# Patient Record
Sex: MALE | Race: ASIAN | NOT HISPANIC OR LATINO | Employment: FULL TIME | URBAN - METROPOLITAN AREA
[De-identification: names, ages, dates, MRNs, and addresses within clinical notes are randomized per-mention and may not be internally consistent; named-entity substitution may affect disease eponyms.]

---

## 2017-09-07 ENCOUNTER — OFFICE VISIT (OUTPATIENT)
Dept: FAMILY MEDICINE | Facility: CLINIC | Age: 37
End: 2017-09-07

## 2017-09-07 VITALS
HEIGHT: 65 IN | SYSTOLIC BLOOD PRESSURE: 102 MMHG | TEMPERATURE: 98.1 F | DIASTOLIC BLOOD PRESSURE: 70 MMHG | HEART RATE: 65 BPM | BODY MASS INDEX: 23.46 KG/M2 | WEIGHT: 140.8 LBS

## 2017-09-07 DIAGNOSIS — M54.9 CVA TENDERNESS: Primary | ICD-10-CM

## 2017-09-07 DIAGNOSIS — S29.012D UPPER BACK STRAIN, SUBSEQUENT ENCOUNTER: ICD-10-CM

## 2017-09-07 LAB
BILIRUBIN UR: NEGATIVE
BLOOD UR: NEGATIVE
GLUCOSE URINE: NEGATIVE
KETONES UR QL: NEGATIVE
LEUKOCYTE ESTERASE UR: NEGATIVE
NITRITE UR QL STRIP: NEGATIVE
PH UR STRIP: 6.5 [PH] (ref 5–7)
PROTEIN UR: NEGATIVE
SP GR UR STRIP: 1.01
UROBILINOGEN UR STRIP-ACNC: NORMAL

## 2017-09-07 RX ORDER — BACLOFEN 10 MG/1
10 TABLET ORAL
Qty: 30 TABLET | Refills: 1 | Status: SHIPPED | OUTPATIENT
Start: 2017-09-07 | End: 2019-12-06

## 2017-09-07 NOTE — PROGRESS NOTES
Preceptor attestation:  Patient seen and discussed with the resident.  Assessment and plan reviewed with resident and agreed upon.  Supervising physician: De[facility]

## 2017-09-07 NOTE — PROGRESS NOTES
"     SUBJECTIVE       Htee Ren is a 37 year old  male with a PMHx significant for:   Patient Active Problem List   Diagnosis     Screening for depression     Presents today with left sided back pain that began about 2-3 days ago.  It has not gotten worse since then.  The pain just will not go away, and he feels it most when he rotates his torso.  He localizes the pain to around the lateral aspect of his ribs mid-back.  He describes it as a \"muscle ache.\"  Initially is was a sharp \"gas\" pain and not it is achy.  No known trauma or overuse injury.  He has not tried any analgesics to help with this pain.  He does endorse \"gassy\" pain and cramping.  He does note intermittently having dysuria with decreased amount of urination.  His urine is yellow in color.  No hematuria.    He notices increased pain with doing his work duties.  He has to leave work early when the pain first started, and he has not been back to work.  He works at a  and has to carry fruit baskets.    Denies chest pain, SOB, nausea, vomiting, diarrhea.    Patient speaks Nimisha and so a phone  was used.    ROS: As stated in HPI.    PMH, Medications and Allergies were reviewed and updated as needed.        OBJECTIVE     Vitals:    09/07/17 1325   BP: 102/70   BP Location: Left arm   Patient Position: Chair   Pulse: 65   Temp: 98.1  F (36.7  C)   TempSrc: Oral   Weight: 140 lb 12.8 oz (63.9 kg)   Height: 5' 5\" (165.1 cm)     Body mass index is 23.43 kg/(m^2).    Gen:  NAD, Nimisha speaking indivdual who is well-developed  HEENT: mucous membranes moist. EOMI, sclera non-icteric, nares patent  Neck: supple without lymphadenopathy, trachea midline  CV:  RRR  - no murmurs, rubs, or gallups  Pulm:  CTAB, no wheezes/rales/rhonchi  ABD: soft, nontender, no masses, no rebound, BS intact throughout  MSK: Patient with full range of motion flexion extension and side bending.  Full range of motion with rotation both right and left, but this is " painful.  Pain to palpation of the left lateral aspect of ribs 8 through 12.  No midline tenderness.  Normal gait.  Negative straight leg raise.  Positive CVA tenderness on the left.  Skin: No rashes or lesions noted.  Psych: Mood and affect appropriate    Results for orders placed or performed in visit on 09/07/17 (from the past 24 hour(s))   Urinalysis, Micro If ([facility])   Result Value Ref Range    Specific Gravity Urine 1.015 1.005 - 1.030    pH Urine 6.5 4.5 - 8.0    Leukocyte Esterase UR Negative NEGATIVE    Nitrite Urine Negative NEGATIVE    Protein UR Negative NEGATIVE    Glucose Urine Negative NEGATIVE    Ketones Urine Negative NEGATIVE    Urobilinogen mg/dL 0.2 E.U./dL 0.2 E.U./dL    Bilirubin UR Negative NEGATIVE    Blood UR Negative NEGATIVE     ASSESSMENT AND PLAN     Htee was seen today for flank pain.    Diagnoses and all orders for this visit:    CVA tenderness  -     Urinalysis, Micro If ([facility])    Upper back strain, subsequent encounter  -     baclofen (LIORESAL) 10 MG tablet; Take 1 tablet (10 mg) by mouth nightly as needed for muscle spasms    Patient has not been seen in our clinic in over 2 years, but there is a previous visit in 2014 that shows he was seen for a left back strain.  Per chart review, similar symptoms were present then, and did resolve with baclofen prescription.    Today on exam, there are no alarming signs or symptoms.  He does not have any rashes or lesions that would be suggestive of shingles.  He does have  symptoms, and CVA tenderness on exam that would be concerning for UTI, pyelonephritis as well as kidney stones.  Patient is comfortable in the exam room.  UA was obtained and did not show any hematuria, or any signs of infection.  Discussed with patient that he likely has musculoskeletal strain of his back secondary to his work and carrying heavy for baskets.  Work note was written restrictions to limit carrying anything greater than 20 pounds for the next 2 weeks.  I  re-prescribed baclofen 10 mg at bedtime as needed for pain.  I advised him to take this at nighttime as this can make him sleepy.  He was instructed to follow-up within 1 month if he was still having symptoms without any improvement.  All of his questions were answered.    RTC in 4 weeks for follow up of back strain if needed, or sooner if develops new or worsening symptoms.    Discussed with MD Mirian Anguiano, PGY-2

## 2017-09-07 NOTE — LETTER
RETURN TO WORK/SCHOOL FORM    9/7/2017    Re: Lazaro Mcclure  1980    To Whom It May Concern:     Lazaro Mcclure was seen in clinic today.  He may return to work with restrictions on 9/8/17.       Restrictions: limited to light duty - lifting no greater than 20 pounds for the next 2 weeks.      Mirian Stout,   9/7/2017 2:21 PM

## 2017-09-07 NOTE — PATIENT INSTRUCTIONS
Your urine did not show an infection.    You likely strain some of the muscles in your back.  I wrote a work note for restrictions to not lift any more than 20 pounds for the next 2 weeks.    Baclofen 10 mg at bedtime to help with the pain.  This can make you sleepy, so taking it at bedtime is important.    Come back if not improved in 3-4 weeks.

## 2017-09-07 NOTE — MR AVS SNAPSHOT
After Visit Summary   2017    Lazaro Mcclure    MRN: [id number]           Patient Information     Date Of Birth          1980        Visit Information        Provider Department      2017 1:30 PM Mirian Stout DO [facility]        Today's Diagnoses     CVA tenderness    -  1    Upper back strain, subsequent encounter          Care Instructions    Your urine did not show an infection.    You likely strain some of the muscles in your back.  I wrote a work note for restrictions to not lift any more than 20 pounds for the next 2 weeks.    Baclofen 10 mg at bedtime to help with the pain.  This can make you sleepy, so taking it at bedtime is important.    Come back if not improved in 3-4 weeks.          Follow-ups after your visit        Who to contact     Please call your clinic at [phone] to:    Ask questions about your health    Make or cancel appointments    Discuss your medicines    Learn about your test results    Speak to your doctor   If you have compliments or concerns about an experience at your clinic, or if you wish to file a complaint, please contact [facility] Patient Relations at [phone] or email us at [email or web address]         Additional Information About Your Visit        MyChart Information     [employer] is an electronic gateway that provides easy, online access to your medical records. With [employer], you can request a clinic appointment, read your test results, renew a prescription or communicate with your care team.     To sign up for [employer] visit the website at [email or web address]   You will be asked to enter the access code listed below, as well as some personal information. Please follow the directions to create your username and password.     Your access code is: [email or web address]  Expires: 2017  2:24 PM     Your access code will  in 90 days. If you need help or a new code, please contact your [facility]  "[facility] or call [phone] for assistance.        Care EveryWhere ID     This is your Care EveryWhere ID. This could be used by other organizations to access your [place] medical records  [id number]        Your Vitals Were     Pulse Temperature Height BMI (Body Mass Index)          65 98.1  F (36.7  C) (Oral) 5' 5\" (165.1 cm) 23.43 kg/m2         Blood Pressure from Last 3 Encounters:   09/07/17 102/70   11/28/14 111/78   10/31/13 100/65    Weight from Last 3 Encounters:   09/07/17 140 lb 12.8 oz (63.9 kg)   11/28/14 136 lb 1.6 oz (61.7 kg)   10/31/13 124 lb (56.2 kg)              We Performed the Following     Urinalysis, Micro If ([facility])          Where to get your medicines      These medications were sent to [facility] - [place], [place] - [address], [place] [address]     Phone:  [phone]     baclofen 10 MG tablet          Primary Care Provider Office Phone # Fax #    Genevieve Malave [phone]       [facility] [address] [place] [address]        Equal Access to Services     MIR SANTOS AH: Hadii art ku hadasho Soomaali, waaxda luqadaha, qaybta kaalmada adeegyada, kamilla marvin. So [facility] [phone].    ATENCIÓN: Si habla español, tiene a bermeo disposición servicios gratuitos de asistencia lingüística. Llame al [phone].    We comply with applicable federal civil rights laws and [place] laws. We do not discriminate on the basis of race, color, national origin, age, disability sex, sexual orientation or gender identity.            Thank you!     Thank you for choosing [facility]  for your care. Our goal is always to provide you with excellent care. Hearing back from our patients is one way we can continue to improve our services. Please take a few minutes to complete the written survey that you may receive in the mail after your visit with us. Thank you!             Your " Updated Medication List - Protect others around you: Learn how to safely use, store and throw away your medicines at www.disposemymeds.org.          This list is accurate as of: 9/7/17  2:24 PM.  Always use your most recent med list.                   Brand Name Dispense Instructions for use Diagnosis    acetaminophen 325 MG tablet    TYLENOL    100 tablet    Take 2 tablets (650 mg) by mouth every 4 hours as needed for mild pain    Back pain       baclofen 10 MG tablet    LIORESAL    30 tablet    Take 1 tablet (10 mg) by mouth nightly as needed for muscle spasms    Upper back strain, subsequent encounter       omeprazole 40 MG capsule    priLOSEC    90 capsule    Take 1 capsule (40 mg) by mouth daily Take 30-60 minutes before a meal.    Gastritis, Helicobacter pylori       psyllium 63 % Powd    METAMUCIL SMOOTH TEXTURE    1 Bottle    Take 3 teaspoonful by mouth 3 times daily Mix in 8 ounces of water    Constipation

## 2019-12-06 ENCOUNTER — OFFICE VISIT (OUTPATIENT)
Dept: FAMILY MEDICINE | Facility: CLINIC | Age: 39
End: 2019-12-06
Payer: COMMERCIAL

## 2019-12-06 VITALS
RESPIRATION RATE: 20 BRPM | DIASTOLIC BLOOD PRESSURE: 79 MMHG | WEIGHT: 151.2 LBS | TEMPERATURE: 97.7 F | BODY MASS INDEX: 25.19 KG/M2 | HEIGHT: 65 IN | SYSTOLIC BLOOD PRESSURE: 117 MMHG | OXYGEN SATURATION: 98 % | HEART RATE: 62 BPM

## 2019-12-06 DIAGNOSIS — Z13.88 SCREENING FOR LEAD EXPOSURE: ICD-10-CM

## 2019-12-06 DIAGNOSIS — Z23 NEED FOR PROPHYLACTIC VACCINATION AND INOCULATION AGAINST INFLUENZA: Primary | ICD-10-CM

## 2019-12-06 ASSESSMENT — MIFFLIN-ST. JEOR: SCORE: 1514.78

## 2019-12-06 NOTE — NURSING NOTE
Due to patient being non-English speaking/uses sign language, an  was used for this visit. Only for face-to-face interpretation by an external agency, date and length of interpretation can be found on the scanned worksheet.     name: Chip Saunders  Agency: [employer]  Language: Nimisha   Telephone number: [phone]  Type of interpretation: Face-to-face, spoken

## 2019-12-06 NOTE — PROGRESS NOTES
Preceptor Attestation:   Patient seen, evaluated and discussed with the resident. I have verified the content of the note, which accurately reflects my assessment of the patient and the plan of care.   Supervising Physician:  Yunior Marmolejo MD

## 2019-12-06 NOTE — LETTER
[facility]  [address] [place] [address]  Phone: [phone]  Fax: [phone]    December 6, 2019        Lazaro Mcclure  [address]   [place] [address]          To whom it may concern:    RE: Htjo Mcclure    Patient was seen and treated today at our clinic and missed work.    Please contact me for questions or concerns.      Sincerely,        Severiano Abraham MD

## 2019-12-06 NOTE — PROGRESS NOTES
"     SUBJECTIVE       Lazaro Mcclure is a 39 year old  male with a PMHx significant for   Patient Active Problem List   Diagnosis     Screening for depression     Who presents today for blood work.    Lazaro Mcclure is here for lead testing as he worked at [employer] for 3 months, he quit sometime last winter. He currently feels well and has no symptoms. His wife and 3 children have not been tested for lead but are all seen at this clinic and are willing to come in for testing as well.    He would like to schedule an appointment for a yearly physical.      ROS: As stated in HPI.    Current medications include:   No current outpatient medications on file.       PMH, Medications and Allergies were reviewed and updated as needed.        OBJECTIVE     Vitals:    12/06/19 1336   BP: 117/79   BP Location: Left arm   Patient Position: Sitting   Pulse: 62   Resp: 20   Temp: 97.7  F (36.5  C)   TempSrc: Oral   SpO2: 98%   Weight: 68.6 kg (151 lb 3.2 oz)   Height: 1.638 m (5' 4.5\")     Body mass index is 25.55 kg/m .    Gen:  Sitting in exam chair, pleasant, NAD  CV:  RRR. No murmurs, rubs, or gallops. Good peripheral pulses  Pulm:  CTAB, no wheezes, rales, or rhonchi  ABD: Bowel sounds present. Abdomen soft and nontender throughout, no masses or rebound  Psych: Mood and affect appropriate    No results found for this or any previous visit (from the past 24 hour(s)).    ASSESSMENT AND PLAN     1. Need for prophylactic vaccination and inoculation against influenza  - Fluzone quad, multidose 0.5ml, 6+ months [31977]    2. Screening for lead exposure  Lazaro Mcclure presents for lead testing.  He worked at [employer] roughly 3 months last year and the company is currently recommending blood lead testing for everybody who worked there as they noticed there was increased lead levels in people who worked there. He also has 3 children and wife at home who have not been tested.  Given his family is also seen in clinic will have them all schedule " a lab only visit for lead testing.  Will call patient and family with results.  - Lead, Blood ([facility])      RTC in 4 weeks for CPE or sooner if develops new or worsening symptoms. Return precautions discussed.     Discussed with MD Severiano Callahan, PGY3  [facility]

## 2019-12-06 NOTE — LETTER
"December 11, 2019      Lazaro Moo  [address]   [place] [address]        Dear Lazaro,    Please see below for your test results.  His lead level is 4.4 which is within the normal range of 0 - 4.9.     Resulted Orders   Lead, Blood (Health system)   Result Value Ref Range    Lead See Note.       Comment:      Reflex testing sent to [facility]. Result to be reported on the   separate reflexed test code.      Collection Method Venous     Narrative    Test performed by:  [facility]  [address] [place] [address]   Lead With Demographics ([facility])   Result Value Ref Range    Lead, Blood (Venous) 4.4 0.0 - 4.9 ug/dL      Comment:      INTERPRETIVE INFORMATION: Lead, Blood (Venous)     Elevated results may be due to skin or collection-related   contamination, including the use of a noncertified lead-free tube.   If contamination concerns exist due to elevated levels of blood   lead, confirmation with a second specimen collected in a certified   lead-free tube is recommended.     Information sources for reference intervals and interpretive   comments include the \"CDC Response to the 2012 Advisory Committee   on Childhood Lead Poisoning Prevention Report\" and the   \"Recommendations for Medical Management of Adult Lead Exposure,   Environmental Health Perspectives, 2007.\" Thresholds and time   intervals for retesting, medical evaluation, and response vary by   state and regulatory body. Contact your State Department of Health   and/or applicable regulatory agency for specific guidance on   medical management recommendations.            Age            Concentration   Comment     All ages       5-9.9 ug/dL     Adverse hea  lth effects are                                  possible, particularly in                                 children under 6 years of                                 age and pregnant women.                                 Discuss health risks                                 " associated with continued                                 lead exposure. For children                                 and women who are or may                                 become pregnant, reduce                                 lead exposure.                  All ages        10-19.9 ug/dL  Reduced lead exposure and                                 increased biological                                 monitoring are recommended.     All ages        20-69.9 ug/dL  Removal from lead exposure                                 and prompt medical                                 evaluation are recommended.                                 Consider chelation therapy                                 when concentrations exceed                                   50 ug/dL and symptoms of                                 lead toxicity are present.     Less than 19     Greater than  Critical. Immediate medical  years of age     44.9 ug/dL    evaluation is recommended.                                 Consider chelation therapy                                  when symptoms of lead                                 toxicity are present.     Greater than 19  Greater than  Critical. Immediate medical  years of age     69.9 ug/dL    evaluation is recommended                                 Consider chelation therapy                                 when symptoms of lead                                  toxicity are present.     Test developed and characteristics determined by [employer]. See Compliance Statement B: [employer] [facility]  Performed by [facility],  [address] [place] [address] [id number] [phone]  www.[employer], Ignacio Johnson MD, Lab. Director      Narrative    Test performed by:  [facility]  [address] [place] [id number]       If you have any questions, please call the clinic to make an appointment.    Sincerely,    Severiano Abraham MD

## 2019-12-07 LAB
COLLECTION METHOD: NORMAL
LEAD BLD-MCNC: NORMAL UG/DL

## 2019-12-11 LAB — LEAD BLDV-MCNC: 4.4 UG/DL (ref 0–4.9)

## 2019-12-22 DIAGNOSIS — Z00.00 PREVENTATIVE HEALTH CARE: Primary | ICD-10-CM

## 2019-12-23 ENCOUNTER — OFFICE VISIT (OUTPATIENT)
Dept: FAMILY MEDICINE | Facility: CLINIC | Age: 39
End: 2019-12-23
Payer: COMMERCIAL

## 2019-12-23 VITALS
TEMPERATURE: 97.3 F | DIASTOLIC BLOOD PRESSURE: 77 MMHG | RESPIRATION RATE: 16 BRPM | OXYGEN SATURATION: 100 % | HEART RATE: 60 BPM | BODY MASS INDEX: 25.86 KG/M2 | SYSTOLIC BLOOD PRESSURE: 127 MMHG | WEIGHT: 155.2 LBS | HEIGHT: 65 IN

## 2019-12-23 DIAGNOSIS — Z00.00 PREVENTATIVE HEALTH CARE: ICD-10-CM

## 2019-12-23 DIAGNOSIS — Z00.00 ROUTINE GENERAL MEDICAL EXAMINATION AT A HEALTH CARE FACILITY: Primary | ICD-10-CM

## 2019-12-23 DIAGNOSIS — E78.1 HYPERTRIGLYCERIDEMIA: ICD-10-CM

## 2019-12-23 LAB
CHOLEST SERPL-MCNC: 183.4 MG/DL (ref 0–200)
CHOLEST/HDLC SERPL: 5.4 {RATIO} (ref 0–5)
HDLC SERPL-MCNC: 34.1 MG/DL
LDLC SERPL CALC-MCNC: ABNORMAL MG/DL (ref 0–129)
LDLC SERPL DIRECT ASSAY-MCNC: 116 MG/DL
TRIGL SERPL-MCNC: 436.3 MG/DL (ref 0–150)
VLDL CHOLESTEROL: 87.3 MG/DL (ref 7–32)

## 2019-12-23 ASSESSMENT — MIFFLIN-ST. JEOR: SCORE: 1532.92

## 2019-12-23 NOTE — NURSING NOTE
Due to patient being non-English speaking/uses sign language, an  was used for this visit. Only for face-to-face interpretation by an external agency, date and length of interpretation can be found on the scanned worksheet.     name: Chip Saunders  Agency: [employer]  Language: Nimisha   Telephone number: [phone]  Type of interpretation: Face-to-face, spoken

## 2019-12-23 NOTE — PROGRESS NOTES
Preceptor Attestation:   Patient seen, evaluated and discussed with the resident. I have verified the content of the note, which accurately reflects my assessment of the patient and the plan of care.   Supervising Physician:  Sandeep Miranda MD.

## 2019-12-23 NOTE — PROGRESS NOTES
"  Male Physical Note      Concerns today: {[facility] CONCERNS:771316}    {:770979}    ROS:                      {ROS normal:865726::\"CONSTITUTIONAL: no fatigue, no unexpected change in weight\",\"SKIN: no worrisome rashes, no worrisome moles, no worrisome lesions\",\"EYES: no acute vision problems or changes\",\"ENT: no ear problems, no mouth problems, no throat problems\",\"RESP: no significant cough, no shortness of breath\",\"CV: no chest pain, no palpitations, no new or worsening peripheral edema\",\"GI: no nausea, no vomiting, no constipation, no diarrhea\"}    History reviewed. No pertinent past medical history.     History reviewed. No pertinent family history.  ***Reviewed no other significant FH           Family History and past Medical History reviewed and unchanged/updated.    Social History     Tobacco Use     Smoking status: Former Smoker     Smokeless tobacco: Never Used     Tobacco comment: 3 cigs per day., can quit on his own   Substance Use Topics     Alcohol use: Not on file     {MARITAL STATUS:153300}  Children ? {NO(DEFAULTED)/YES(STAR):093338}    Has anyone hurt you physically, for example by pushing, hitting, slapping or kicking you or forcing you to have sex? {[facility] DENIES:585468::\"Denies\"}  Do you feel threatened or controlled by a partner, ex-partner or anyone in your life? {[facility] DENIES:177873::\"Denies\"}    RISK BEHAVIORS AND HEALTHY HABITS:  Tobacco Use/Smoking: {[facility] NONE DETAILS:730722::\"None\"}  Illicit Drug Use: {[facility] NONE DETAILS:504105::\"None\"}  ETOH: {[facility] NONE DETAILS:270311::\"None\"}  Do you use alcohol? {[facility] ALCOHOL:943014::\"Yes\",\"Number of drinks per day ***\",\"Number of drinking days a week ***\"}  Sexually Active: {[facility] SEXUALY ACTIVE:597286}  Diet (5-7 servings of fruits/veg daily): {YES / NO:487634::\"Yes\"}   Exercise (30 min accumulated most days):{YES / NO:749036::\"Yes\"}  Dental Care: {YES / NO:582756::\"Yes\"}   Calcium 1500 mg/d:  {YES / NO:936249::\"Yes\"}  Seat Belt Use: {YES / " "NO:168039::\"Yes\"}     {[facility] USPSTF Grade A:016516}  {[facility] USPSTF Grade B:124592}  {[facility] USPSTF GradeD:782233}    CV Risk based on Pooled Cohort Risk:***   {Pooled cohort Risk Calculator}    Immunization History   Administered Date(s) Administered     HEPA 06/06/2011, 10/04/2012     HepB 01/27/2010, 09/14/2010, 05/02/2011     Influenza (IIV3) PF 10/04/2012, 10/15/2014     MMR 06/06/2011     Pneumococcal 23 valent 06/06/2011     TD (ADULT, 7+) 09/14/2010, 10/04/2012     Tdap (Adacel,Boostrix) 06/06/2011     Varicella Pt Report Hx of Varicella/Chicken Pox 01/01/1989    *** Reviewed Immunization Record Today    EXAMINATION:  /77   Pulse 60   Temp 97.3  F (36.3  C) (Oral)   Resp 16   Ht 1.638 m (5' 4.5\")   Wt 70.4 kg (155 lb 3.2 oz)   SpO2 100%   BMI 26.23 kg/m    {EXAM - MALE- zebra stripe:040743::\"GENERAL: healthy, alert and no distress\",\"EYES: Eyes grossly normal to inspection, extraocular movements - intact, and PERRL\",\"HENT: ear canals- normal; TMs- normal; Nose- normal; Mouth- no ulcers, no lesions\",\"NECK: no tenderness, no adenopathy, no asymmetry, no masses, no stiffness; thyroid- normal to palpation\",\"RESP: lungs clear to auscultation - no rales, no rhonchi, no wheezes\",\"BREAST: no masses, no tenderness, no nipple discharge, no palpable axillary masses or adenopathy\",\"CV: regular rates and rhythm, normal S1 S2, no S3 or S4 and no murmur, no click or rub -\",\"ABDOMEN: soft, no tenderness, no  hepatosplenomegaly, no masses, normal bowel sounds\",\"MS: extremities- no gross deformities noted, no edema\",\"SKIN: no suspicious lesions, no rashes\",\"NEURO: strength and tone- normal, sensory exam- grossly normal, mentation- intact, speech- normal, reflexes- symmetric\",\"BACK: no CVA tenderness, no paralumbar tenderness\",\"- male: testicles- normal, no atrophy, no masses;  no inguinal hernias\",\"RECTAL- male: no masses, no hemorhoids, Prostate- symmetric, no  nodularity, no masses, no hypertrophy\",\"PSYCH: Alert and " "oriented times 3; speech- coherent , normal rate and volume; able to articulate logical thoughts, able to abstract reason, no tangential thoughts, no hallucinations or delusions, affect- normal\",\"LYMPHATICS: ant. cervical- normal, post. cervical- normal, axillary- normal, supraclavicular- normal, inguinal- normal\"}    ASSESSMENT:  {[facility] ASSESSMENT:494672::\"1. Health Care Maintenance: Normal Physical Exam\"}    PLAN:  1.{[facility] MALE PLAN:244262}  2. ***  3. ***  4. ***    {[facility] PX COMMUNICATION:117574}  "

## 2019-12-23 NOTE — PROGRESS NOTES
Male Physical Note      Concerns today: No special concerns today.    A [email or web address]  was used for  this visit.     ROS:                      CONSTITUTIONAL: no fatigue, no unexpected change in weight  SKIN: no worrisome rashes, no worrisome moles, no worrisome lesions  EYES: no acute vision problems or changes  ENT: no ear problems, no mouth problems, no throat problems  RESP: no significant cough, no shortness of breath  CV: no chest pain, no palpitations, no new or worsening peripheral edema  GI: no nausea, no vomiting, no constipation, no diarrhea    History reviewed. No pertinent past medical history.     History reviewed. No pertinent family history.  Reviewed no other significant FH           Family History and past Medical History reviewed and unchanged/updated.    Social History     Tobacco Use     Smoking status: Former Smoker     Smokeless tobacco: Never Used     Tobacco comment: 3 cigs per day., can quit on his own   Substance Use Topics     Alcohol use: Not on file       Children ? Yes-- 3 kids (15 yo girl, 10 yo girl, 9 yo boy)    Has anyone hurt you physically, for example by pushing, hitting, slapping or kicking you or forcing you to have sex? Denies  Do you feel threatened or controlled by a partner, ex-partner or anyone in your life? Denies    RISK BEHAVIORS AND HEALTHY HABITS:  Tobacco Use/Smoking: None  Illicit Drug Use: None  ETOH: Details: very rarely (1-2 beers per month  Sexually Active: Yes  Diet (5-7 servings of fruits/veg daily): Yes; drinks at least one can of coca cola every day  Exercise (30 min accumulated most days):Yes  Dental Care: No; recommended visit   Calcium 1500 mg/d:  No  Seat Belt Use: Yes     Cholesterol Level (>44 yo or at risk):  Recommended and patient accepted testing.  HIV: already completed and negative      Immunization History   Administered Date(s) Administered     HEPA 06/06/2011, 10/04/2012     HepB 01/27/2010, 09/14/2010, 05/02/2011     Influenza  "(IIV3) PF 10/04/2012, 10/15/2014     MMR 06/06/2011     Pneumococcal 23 valent 06/06/2011     TD (ADULT, 7+) 09/14/2010, 10/04/2012     Tdap (Adacel,Boostrix) 06/06/2011     Varicella Pt Report Hx of Varicella/Chicken Pox 01/01/1989    Reviewed Immunization Record Today    EXAMINATION:  /77   Pulse 60   Temp 97.3  F (36.3  C) (Oral)   Resp 16   Ht 1.638 m (5' 4.5\")   Wt 70.4 kg (155 lb 3.2 oz)   SpO2 100%   BMI 26.23 kg/m    GENERAL: healthy, alert and no distress  EYES: Eyes grossly normal to inspection, extraocular movements - intact, and PERRL  HENT: ear canals- normal; TMs- normal; Nose- normal; Mouth- no ulcers, no lesions  NECK: no tenderness, no adenopathy, no asymmetry, no masses, no stiffness; thyroid- normal to palpation  RESP: lungs clear to auscultation - no rales, no rhonchi, no wheezes  CV: regular rates and rhythm, normal S1 S2, no S3 or S4 and no murmur, no click or rub -  ABDOMEN: soft, no tenderness, no  hepatosplenomegaly, no masses, normal bowel sounds  MS: extremities- no gross deformities noted, no edema  SKIN: no suspicious lesions, no rashes  NEURO: strength and tone- normal, sensory exam- grossly normal, mentation- intact, speech- normal, reflexes- symmetric  BACK: no CVA tenderness, no paralumbar tenderness  - male: testicles- normal, no atrophy, no masses;  no inguinal hernias  PSYCH: Alert and oriented times 3; speech- coherent , normal rate and volume; able to articulate logical thoughts, able to abstract reason, no tangential thoughts, no hallucinations or delusions, affect- normal  LYMPHATICS: ant. cervical- normal, post. cervical- normal,    ASSESSMENT AND PLAN:  1. Preventative health care  2. Routine general medical examination at a health care facility  Overall healthy 39-year-old male without complaints today.  Triglyceridemia, discussed below.  Patient will work on cutting out sugary beverages, decreasing fatty/fried foods.  Will work on exercising outside of " normal work activities.  - Lipid Panel ([facility])  - LDL Cholesterol  Direct ([facility])  -  : Sign Language or Oral - 38-52 minutes    3. Hypertriglyceridemia  Discussed exercise and dietary changes.  No indication to start medication therapy at this time.  We will recheck lipids when he returns for his complete physical exam in 1 year.    Patient discussed with attending physician Dr. Miranda who agrees with the plan.     Jaya Moore MD PGY2  [facility]

## 2020-11-12 ENCOUNTER — VIRTUAL VISIT (OUTPATIENT)
Dept: FAMILY MEDICINE | Facility: CLINIC | Age: 40
End: 2020-11-12
Payer: COMMERCIAL

## 2020-11-12 VITALS — BODY MASS INDEX: 18.63 KG/M2 | WEIGHT: 110.23 LBS

## 2020-11-12 DIAGNOSIS — Z20.822 EXPOSURE TO COVID-19 VIRUS: ICD-10-CM

## 2020-11-12 DIAGNOSIS — K59.00 CONSTIPATION, UNSPECIFIED CONSTIPATION TYPE: ICD-10-CM

## 2020-11-12 DIAGNOSIS — R50.9 FEVER, UNSPECIFIED FEVER CAUSE: ICD-10-CM

## 2020-11-12 DIAGNOSIS — R05.9 COUGH: ICD-10-CM

## 2020-11-12 DIAGNOSIS — Z20.822 EXPOSURE TO COVID-19 VIRUS: Primary | ICD-10-CM

## 2020-11-12 LAB
COVID-19 VIRUS PCR TO U OF MN - SOURCE: ABNORMAL
SARS-COV-2 RNA SPEC QL NAA+PROBE: ABNORMAL

## 2020-11-12 PROCEDURE — 99213 OFFICE O/P EST LOW 20 MIN: CPT | Mod: 95 | Performed by: STUDENT IN AN ORGANIZED HEALTH CARE EDUCATION/TRAINING PROGRAM

## 2020-11-12 PROCEDURE — 87635 SARS-COV-2 COVID-19 AMP PRB: CPT | Performed by: STUDENT IN AN ORGANIZED HEALTH CARE EDUCATION/TRAINING PROGRAM

## 2020-11-12 PROCEDURE — 99207 PR NO CHARGE LOS: CPT | Performed by: STUDENT IN AN ORGANIZED HEALTH CARE EDUCATION/TRAINING PROGRAM

## 2020-11-12 RX ORDER — GUAIFENESIN 600 MG/1
1200 TABLET, EXTENDED RELEASE ORAL 2 TIMES DAILY
Qty: 30 TABLET | Refills: 1 | Status: SHIPPED | OUTPATIENT
Start: 2020-11-12 | End: 2024-01-04

## 2020-11-12 RX ORDER — POLYETHYLENE GLYCOL 3350 17 G/17G
1 POWDER, FOR SOLUTION ORAL DAILY PRN
Qty: 30 PACKET | Refills: 1 | Status: SHIPPED | OUTPATIENT
Start: 2020-11-12 | End: 2024-01-04

## 2020-11-12 RX ORDER — IBUPROFEN 400 MG/1
400 TABLET, FILM COATED ORAL EVERY 6 HOURS PRN
Qty: 60 TABLET | Refills: 1 | Status: SHIPPED | OUTPATIENT
Start: 2020-11-12 | End: 2023-02-24

## 2020-11-12 NOTE — LETTER
November 12, 2020      Lazaro Moo  [address]   [place] [address]    To Whom it may concern.    Lazaro has been recommended quarantine until 72 hours after his symptoms have resolved or after a negative COVID test unless further notified.     Sincerely,    Que Morris MD

## 2020-11-12 NOTE — PROGRESS NOTES
"Family Medicine Telephone Visit Note             Telephone Visit Consent   Patient was verbally read the following and verbal consent was obtained.    \"Telephone visits are billed at different rates depending on your insurance coverage. During this emergency period, for some insurers they may be billed the same as an in-person visit.  Please reach out to your insurance provider with any questions.  If during the course of the call the physician/provider feels a telephone visit is not appropriate, you will not be charged for this service.\"    Name person giving consent:  Patient   Date verbal consent given:  11/12/2020  Time verbal consent given:  8:03 AM           Chief Complaint   Patient presents with     Cough     having cough, feel chill maybe fever, stuffy and running nose for three days. also lose of smell     Pharyngitis     having throat pain when coughing and cannot sleep.      Recheck Medication     take tylenol and Corinne [place] allergies med. the med did not help         Due to patient being non-English speaking/uses sign language, an  was used for this visit. Only for face-to-face interpretation by an external agency, date and length of interpretation can be found on the scanned worksheet.     name: Lourdes Celestine  Agency: [employer]  Language: Nimisha   Telephone number: [phone]  Type of interpretation: Telephone, spoken         HPI   Patients name: Lazaro  Appointment start time:  8:12 AM    Lazaro is has fever, cough chills, stuffy, and runny nose for the last three days. Because of his cough he has a sore throat which interferes with his sleep. He also reports constipation. He has been using tylenol and a decongestant without relief.     No difficulty breathing, no chest pain (just chest tightness with coughing), no diarrhea, no sick contacts or contact with covid.     He also requires a work note.     Current Outpatient Medications   Medication Sig Dispense Refill     guaiFENesin " (MUCINEX) 600 MG 12 hr tablet Take 2 tablets (1,200 mg) by mouth 2 times daily 30 tablet 1     ibuprofen (ADVIL/MOTRIN) 400 MG tablet Take 1 tablet (400 mg) by mouth every 6 hours as needed for moderate pain 60 tablet 1     polyethylene glycol (MIRALAX) 17 g packet Take 17 g by mouth daily as needed for constipation Can take up from 0-3 times daily depending on degree on constipation. 30 packet 1     Allergies   Allergen Reactions     Nkda [No Known Drug Allergies]                Assessment and Plan       ICD-10-CM    1. Exposure to COVID-19 virus  Z20.828 COVID-19 Virus PCR [facility] ([facility])   2. Constipation, unspecified constipation type  K59.00 polyethylene glycol (MIRALAX) 17 g packet   3. Cough  R05 guaiFENesin (MUCINEX) 600 MG 12 hr tablet   4. Fever, unspecified fever cause  R50.9 ibuprofen (ADVIL/MOTRIN) 400 MG tablet     - Discussed warning signs and symptoms that would prompt presentation to the clinic or ER.   - Discussed Quarantine precautions as per the CDC guidelines.   - Discussed management of symptoms as per the CDC    Appointment end time: 8:27 AM  This is a telephone visit that took 15 minutes.      Clinician location:  [place]     Que Morris MD  I precepted today with Dr. Marmolejo.

## 2020-11-16 ENCOUNTER — TELEPHONE (OUTPATIENT)
Dept: FAMILY MEDICINE | Facility: CLINIC | Age: 40
End: 2020-11-16

## 2020-11-16 DIAGNOSIS — Z59.9 FINANCIAL PROBLEMS: Primary | ICD-10-CM

## 2020-11-16 SDOH — ECONOMIC STABILITY - INCOME SECURITY: PROBLEM RELATED TO HOUSING AND ECONOMIC CIRCUMSTANCES, UNSPECIFIED: Z59.9

## 2020-11-16 NOTE — TELEPHONE ENCOUNTER
[facility] phone call message- patient requesting results:    Test: Lab    Date of test: 11/12/20    Additional Comments: COVID RESULTS    OK to leave a message on voice mail? Yes         Primary language: Nimisha      needed? Yes    Call taken on November 16, 2020 at 9:33 AM by Cory Mccoy

## 2020-11-16 NOTE — TELEPHONE ENCOUNTER
Patient informed of positive PCR test for COVID-19.    Patient began experiencing symptoms on 11/9/20. Patient aware that per CDC guidelines, he/she/they can be released from isolation when the following criteria is met:    At least 10 days* have passed since symptom onset and  At least 24 hours have passed since resolution of fever without the use of fever-reducing medications and  Other symptoms have improved.    *A limited number of persons with severe illness may produce replication-competent virus beyond 10 days, that may warrant extending duration of isolation for up to 20 days after symptom onset. Consider consultation with infection control experts. See Discontinuation of Transmission-Based Precautions and Disposition of Patients with COVID-19 in Healthcare Settings (Interim Guidance).      Went over following isolation precautions with the patient:  Separate a household member who is sick  Provide a separate bedroom and bathroom for the person who is sick, if possible. If you cannot provide a separate room and bathroom, try to separate them from other household members as much as possible. Keep people at higher risk  from anyone who is sick.  If possible, have only one person in the household take care of the person who is sick. This caregiver should be someone who is not at higher risk for severe illness and should minimize contact with other people in the household.  Identify a different caregiver for other members of the household who require help with cleaning, bathing, or other daily tasks.  If possible, maintain 6 feet between the person who is sick and other family or household members.  If you need to share a bedroom with someone who is sick, make sure the room has good air flow.  Open the window and turn on a fan to bring in and circulate fresh air if possible.  Maintain at least 6 feet between beds if possible.  Sleep head to toe if sleeping with another person.  Put a curtain around or  place other physical divider (e.g., shower curtain, room screen divider, large cardboard poster board, quilt, or large bedspread) to separate the ill person s bed.  If you need to share a bathroom with someone who is sick, the person who is sick should clean and disinfect the frequently touched surfaces in the bathroom after each use. If this is not possible, the person who does the cleaning should:  Open outside doors and windows before entering and use ventilating fans to increase air circulation in the area.  Wait as long as possible before entering the room to clean and disinfect or to use the bathroom.  If you are sick, do not help prepare food. Also, eat separately from the family.  https://www.cdc.gov/coronavirus/2019-ncov/daily-life-coping/living-in-close-quarters.html    If patient reports symptomatic and/or asymptomatic exposures in home: recommended having them call our clinic at [phone] or their own PCP to discuss testing. Recommended all exposures isolate for 14 days from last exposure to patient regardless of symptoms.    Legal screening:  Patient does: does not have paid time off at work. If no, ask if they would like assistance filing for unemployment. Route chart to [employer] or social work.     Patient aware to call the clinic with new or worsening symptoms, including shortness of breath and chest pain. ./LR

## 2020-11-18 NOTE — TELEPHONE ENCOUNTER
November 18, 2020   Legal Services Referral - [place] only  Legal referral has been sent to Marisa Adrian from [facility].     Scan/Send demographics and referral to [email or web address]    She will review, advise, and contact the patient.     Frances Silva

## 2021-05-21 ENCOUNTER — IMMUNIZATION (OUTPATIENT)
Dept: FAMILY MEDICINE | Facility: CLINIC | Age: 41
End: 2021-05-21
Payer: COMMERCIAL

## 2021-05-21 PROCEDURE — 91301 PR COVID VAC MODERNA 100 MCG/0.5 ML IM: CPT

## 2021-05-21 PROCEDURE — 0011A PR COVID VAC MODERNA 100 MCG/0.5 ML IM: CPT

## 2021-06-18 ENCOUNTER — IMMUNIZATION (OUTPATIENT)
Dept: FAMILY MEDICINE | Facility: CLINIC | Age: 41
End: 2021-06-18
Attending: FAMILY MEDICINE
Payer: COMMERCIAL

## 2021-06-18 PROCEDURE — 0012A PR COVID VAC MODERNA 100 MCG/0.5 ML IM: CPT

## 2021-06-18 PROCEDURE — 91301 PR COVID VAC MODERNA 100 MCG/0.5 ML IM: CPT

## 2021-06-21 ENCOUNTER — OFFICE VISIT (OUTPATIENT)
Dept: FAMILY MEDICINE | Facility: CLINIC | Age: 41
End: 2021-06-21
Payer: COMMERCIAL

## 2021-06-21 VITALS
TEMPERATURE: 98.4 F | DIASTOLIC BLOOD PRESSURE: 78 MMHG | SYSTOLIC BLOOD PRESSURE: 115 MMHG | WEIGHT: 161.2 LBS | RESPIRATION RATE: 18 BRPM | HEART RATE: 60 BPM | OXYGEN SATURATION: 100 % | BODY MASS INDEX: 27.24 KG/M2

## 2021-06-21 DIAGNOSIS — M25.572 PAIN IN JOINT, ANKLE AND FOOT, LEFT: Primary | ICD-10-CM

## 2021-06-21 PROCEDURE — 99213 OFFICE O/P EST LOW 20 MIN: CPT | Mod: GC | Performed by: STUDENT IN AN ORGANIZED HEALTH CARE EDUCATION/TRAINING PROGRAM

## 2021-06-21 RX ORDER — NAPROXEN 500 MG/1
500 TABLET ORAL 2 TIMES DAILY PRN
Qty: 60 TABLET | Refills: 0 | Status: SHIPPED | OUTPATIENT
Start: 2021-06-21 | End: 2023-02-24

## 2021-06-21 NOTE — NURSING NOTE
Due to patient being non-English speaking/uses sign language, an  was used for this visit. Only for face-to-face interpretation by an external agency, date and length of interpretation can be found on the scanned worksheet.     name: Ene  Agency: [employer]   Language: Nimisha   Telephone number: ID: [id number]  Type of interpretation: Telephone, spoken

## 2021-06-21 NOTE — LETTER
"RETURN TO WORK/SCHOOL FORM    6/21/2021    Re: Lazaro Mcclure  1980      To Whom It May Concern:    Lazaro Mcclure was seen in clinic today. Due to ongoing ankle problem, it is my medical recommendation that he  He may return to work {WITH/WITHOUT:[id number]::\"with\"} restrictions on 6/22/21          Sincerely,      Que Quintero MD  6/21/2021   "

## 2021-06-21 NOTE — PROGRESS NOTES
Preceptor Attestation:   Patient seen, evaluated and discussed with the resident. I have verified the content of the note, which accurately reflects my assessment of the patient and the plan of care.   Supervising Physician:  Yunior Stark MD

## 2021-06-21 NOTE — PROGRESS NOTES
Assessment & Plan     Pain in joint, ankle and foot, left  Presents with longstanding history of intermittent left ankle pain that tends to flareup with prolonged standing and walking.  Possible history of repeated ankle injuries in [facility].  Suspect arthritis versus stress fracture at this point given intermittent pain provoked by ambulation and prolonged standing.  No acute injury to suspect acute fracture.  Sprain possible although the chronicity of the pain makes this seem less likely.  No evidence or history of erythema and edema to suspect gout.  We will treat conservatively with NSAIDs and activity modifications.  Will follow-up in 4 weeks to reassess symptoms.  Could consider evaluation for stress fracture with MRI at that time.  - naproxen (NAPROSYN) 500 MG tablet  Dispense: 60 tablet; Refill: 0    25 minutes spent on the date of the encounter doing chart review, history and exam, documentation and further activities per the note    Clinician location:  [facility]    Return to clinic in 4 weeks for follow-up ankle pain or sooner if develops new or worsening symptoms.    Patient was discussed with attending physician, Dr. Yunior Stark, who agrees with the assessment and plan.    Que Quintero, PGY-3  [facility]  06/21/21      SUBJECTIVE:  Lazaro Mcclure is a 41 year old male old who presents to clinic today with chief complaint of leg pain.    Has had leg pain for the past several years. Pain tends to come and go. Pain is in the left ankle. Pain feels deep in the ankle joint. Taking a physical break off his feet helps with the pain. Standing and walking for prolonged periods worsens the pain. Thinks the pain is due to walking a lot and doing physically demanding work in [facility]. Had to carry heavy loads in [facility]. Reports falling and injuring the ankle while in [facility].    Pain has been occurring daily for the past two weeks. Pain is worse while working. 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 Needs to be on his feet to perform his work duties. Does not have pain at present, as he has not worked for the past 2 days.    Pain is 3 out of 10 severity. Pain is \"sharp.\" Uses Tylenol to help with the pain. Does not notice much improvement in the pain.    No redness or swelling of the left ankle. No fever or chills.    Review of Systems:  CONSTITUTIONAL: See above.  SKIN: See above.  MUSCULOSKELETAL: See above.  NEUROLOGIC: See above.      OBJECTIVE:  /78 (BP Location: Right arm, Patient Position: Sitting, Cuff Size: Adult Regular)   Pulse 60   Temp 98.4  F (36.9  C) (Oral)   Resp 18   Wt 73.1 kg (161 lb 3.2 oz)   SpO2 100%   BMI 27.24 kg/m      Physical Exam:   GENERAL: Awake, alert. Well-nourished, well-developed. No acute distress. Appears comfortable, non-toxic.  SKIN: No rashes, lesions, erythema, petechiae, purpura, ecchymosis, or jaundice.  MUSCULOSKELETAL: No gross deformity, erythema, warmth or effusion of the knee, ankle, or foot bilaterally. Focal tenderness to palpation just inferior to the lateral malleolus on the left at the region of the ATFL ligament. Full range of motion throughout the ankles with full and symmetric dorsiflexion, plantar flexion, inversion and eversion; mild pain provoked with inversion of left foot. Lachman's of left ankle negative. Tone is normal.  NEUROLOGIC: Sensation to light touch is intact throughout the lower extremities. Motor strength is 5/5 throughout the lower extremities bilaterally. Gait is normal.  PSYCH: Normal affect, mood, orientation, memory and insight.    "

## 2022-03-17 ENCOUNTER — IMMUNIZATION (OUTPATIENT)
Dept: FAMILY MEDICINE | Facility: CLINIC | Age: 42
End: 2022-03-17
Payer: COMMERCIAL

## 2022-03-17 PROCEDURE — 0053A COVID-19,PF,PFIZER (12+ YRS): CPT

## 2022-03-17 PROCEDURE — 91305 COVID-19,PF,PFIZER (12+ YRS): CPT

## 2023-02-24 ENCOUNTER — OFFICE VISIT (OUTPATIENT)
Dept: FAMILY MEDICINE | Facility: CLINIC | Age: 43
End: 2023-02-24
Payer: COMMERCIAL

## 2023-02-24 VITALS
DIASTOLIC BLOOD PRESSURE: 78 MMHG | HEART RATE: 71 BPM | TEMPERATURE: 97.9 F | SYSTOLIC BLOOD PRESSURE: 118 MMHG | WEIGHT: 174 LBS | OXYGEN SATURATION: 97 % | BODY MASS INDEX: 29.41 KG/M2 | RESPIRATION RATE: 16 BRPM

## 2023-02-24 DIAGNOSIS — S39.012A STRAIN OF LUMBAR REGION, INITIAL ENCOUNTER: Primary | ICD-10-CM

## 2023-02-24 PROCEDURE — 99213 OFFICE O/P EST LOW 20 MIN: CPT | Mod: GC | Performed by: STUDENT IN AN ORGANIZED HEALTH CARE EDUCATION/TRAINING PROGRAM

## 2023-02-24 RX ORDER — ACETAMINOPHEN 325 MG/1
325-650 TABLET ORAL EVERY 6 HOURS PRN
Qty: 60 TABLET | Refills: 1 | Status: SHIPPED | OUTPATIENT
Start: 2023-02-24 | End: 2023-03-20

## 2023-02-24 RX ORDER — CAPSAICIN 0.025 %
2 CREAM (GRAM) TOPICAL 3 TIMES DAILY PRN
Qty: 120 G | Refills: 0 | Status: SHIPPED | OUTPATIENT
Start: 2023-02-24 | End: 2024-01-04

## 2023-02-24 RX ORDER — NAPROXEN 500 MG/1
500 TABLET ORAL 2 TIMES DAILY PRN
Qty: 40 TABLET | Refills: 0 | Status: SHIPPED | OUTPATIENT
Start: 2023-02-24 | End: 2024-01-04

## 2023-02-24 RX ORDER — CAPSAICIN 0.025 %
2 CREAM (GRAM) TOPICAL 3 TIMES DAILY
Qty: 120 G | Refills: 0 | Status: SHIPPED | OUTPATIENT
Start: 2023-02-24 | End: 2023-02-24

## 2023-02-24 NOTE — PROGRESS NOTES
Assessment & Plan   Strain of lumbar region, initial encounter  Suspect strain of lower back muscles, manage with meds as below and activity modification at work. F/u 4 wks to see if any chronic issues. Discussed stretching and limiting physical activity for next few days/weeks. Appears to have history of muscle strains, previously of upper back and neck.   - naproxen (NAPROSYN) 500 MG tablet  Dispense: 40 tablet; Refill: 0  - acetaminophen (TYLENOL) 325 MG tablet  Dispense: 60 tablet; Refill: 1  - capsaicin (ZOSTRIX) 0.025 % external cream  Dispense: 120 g; Refill: 0    Diagnosis or treatment significantly limited by social determinants of health - Nimisha speaking      Patient Instructions   Avoid taking Naproxen and Ibuprofen together. Only Naproxen that has been sent to your pharmacy       Follow Up:  Return in about 4 weeks (around 3/24/2023) for back pain .    Options for treatment and follow-up care were reviewed with the patient who was engaged and actively involved in the decision making process, verbalized understanding of the options discussed, and satisfied with the final plan.    Patient was staffed with supervising physician, Dr. Nunu Pagan, who agrees with the findings and plan.     Killian Thompson DO, PGY-3  [facility]      Subjective   Htjo Mcclure is a 43 year old year old male who presents for the following health issues  accompanied by his daughter  No past medical history on file.  Patient Active Problem List   Diagnosis     Screening for depression     Chief Complaint   Patient presents with     Back Pain     Lower right side of back-he was sitting down eating went to stand up and had this sharp pain   Back pain:   Lower R sided back pain  Sudden onset after getting up from seated position yesterday, pain worsening since. Thinks he twisted his lower back muscle  Pain rated 10/10, constant, characterized as stiffness and lack of mobility.  Worsening factors include standing up and  moving around  Master't tried to see if anything helps pain yet.   Feels like his muscle is dislocated.     Had similar symptoms of neck and upper back, but never of lower back.     Hx of L sided back pain in 2017, thought to be spasms.     Work as     Review of Systems:   Constitutional, HEENT, cardiovascular, pulmonary, GI, , musculoskeletal, neuro, skin, endocrine and psych systems are negative, except as otherwise noted.     Objective     /78 (BP Location: Left arm, Patient Position: Sitting, Cuff Size: Adult Large)   Pulse 71   Temp 97.9  F (36.6  C) (Oral)   Resp 16   Wt 78.9 kg (174 lb)   SpO2 97%   BMI 29.41 kg/m    Body mass index is 29.41 kg/m .    Physical Exam  Cardiovascular:      Rate and Rhythm: Normal rate and regular rhythm.      Pulses: Normal pulses.      Heart sounds: Normal heart sounds. No murmur heard.  Pulmonary:      Effort: Pulmonary effort is normal. No respiratory distress.      Breath sounds: Normal breath sounds.   Musculoskeletal:         General: No swelling, tenderness, deformity or signs of injury.      Comments: Pain with resisted trunk flexion and extension. Hunched gait.   Negative SLR test.   FRANTZ and FADIR negative.         ----- Service Performed and Documented by Resident or Fellow ------

## 2023-02-24 NOTE — PROGRESS NOTES
Preceptor Attestation:  Vitals:    02/24/23 1026   BP: 118/78   BP Location: Left arm   Patient Position: Sitting   Cuff Size: Adult Large   Pulse: 71   Resp: 16   Temp: 97.9  F (36.6  C)   TempSrc: Oral   SpO2: 97%   Weight: 78.9 kg (174 lb)          I discussed the patient with the resident and evaluated the patient in person. I have verified the content of the note, which accurately reflects my assessment of the patient and the plan of care.   Supervising Physician:  Nunu Pagan MD

## 2023-02-24 NOTE — LETTER
RETURN TO WORK/SCHOOL FORM    2/24/2023    Re: Lazaro Mcclure  1980      To Whom It May Concern:     Lazaro Mcclure was seen in clinic today.  He may return to work with restrictions on 2/27/23          Restrictions:  limited to light duty - lifting no greater than 20 pounds for 1 week (until 3/6/23)       Killian Thompson,   2/24/2023 10:57 AM

## 2023-03-20 ENCOUNTER — OFFICE VISIT (OUTPATIENT)
Dept: FAMILY MEDICINE | Facility: CLINIC | Age: 43
End: 2023-03-20
Payer: COMMERCIAL

## 2023-03-20 VITALS
SYSTOLIC BLOOD PRESSURE: 118 MMHG | TEMPERATURE: 97.9 F | DIASTOLIC BLOOD PRESSURE: 82 MMHG | BODY MASS INDEX: 29.14 KG/M2 | RESPIRATION RATE: 14 BRPM | OXYGEN SATURATION: 98 % | WEIGHT: 172.4 LBS | HEART RATE: 70 BPM

## 2023-03-20 DIAGNOSIS — Z23 NEED FOR VACCINATION: ICD-10-CM

## 2023-03-20 DIAGNOSIS — Z23 HIGH PRIORITY FOR 2019-NCOV VACCINE: ICD-10-CM

## 2023-03-20 DIAGNOSIS — S39.012A STRAIN OF LUMBAR REGION, INITIAL ENCOUNTER: ICD-10-CM

## 2023-03-20 PROCEDURE — 0124A COVID-19 VACCINE BIVALENT BOOSTER 12+ (PFIZER): CPT | Performed by: STUDENT IN AN ORGANIZED HEALTH CARE EDUCATION/TRAINING PROGRAM

## 2023-03-20 PROCEDURE — 91312 COVID-19 VACCINE BIVALENT BOOSTER 12+ (PFIZER): CPT | Performed by: STUDENT IN AN ORGANIZED HEALTH CARE EDUCATION/TRAINING PROGRAM

## 2023-03-20 PROCEDURE — 90715 TDAP VACCINE 7 YRS/> IM: CPT | Performed by: STUDENT IN AN ORGANIZED HEALTH CARE EDUCATION/TRAINING PROGRAM

## 2023-03-20 PROCEDURE — 99213 OFFICE O/P EST LOW 20 MIN: CPT | Mod: 25 | Performed by: STUDENT IN AN ORGANIZED HEALTH CARE EDUCATION/TRAINING PROGRAM

## 2023-03-20 PROCEDURE — 90471 IMMUNIZATION ADMIN: CPT | Performed by: STUDENT IN AN ORGANIZED HEALTH CARE EDUCATION/TRAINING PROGRAM

## 2023-03-20 RX ORDER — ACETAMINOPHEN 325 MG/1
325-650 TABLET ORAL EVERY 6 HOURS PRN
Qty: 60 TABLET | Refills: 1 | Status: SHIPPED | OUTPATIENT
Start: 2023-03-20 | End: 2024-01-04

## 2023-03-20 RX ORDER — NAPROXEN 500 MG/1
500 TABLET ORAL 2 TIMES DAILY PRN
Qty: 40 TABLET | Refills: 0 | Status: CANCELLED | OUTPATIENT
Start: 2023-03-20

## 2023-03-20 NOTE — PATIENT INSTRUCTIONS
Schedule a repeat visit for OMT if your pain worsens    Your physician has referred you for Osteopathic Manual Treatment:     This is a hands on approach where the physician will use specific techniques to move muscles and joints to diagnose and treat illness or injury.  This can include stretching, gentle pressure and resistance. The treatment can be used to increase overall mobility and symmetry of the body in an effort to ease pain and promote healing.     Physician's who perform OMT at [facility]:   Dr. Brianne Timmons

## 2023-03-20 NOTE — PROGRESS NOTES
Assessment & Plan   Strain of lumbar region, initial encounter  Refill, counseled on stretches and activity modifications at home. Pt to schedule OMT visit if pain persists. No current indication for imaging given resolution of pain and lack of red flag symptoms.   - acetaminophen (TYLENOL) 325 MG tablet  Dispense: 60 tablet; Refill: 1    Need for vaccination  - TDAP VACCINE (Adacel, Boostrix)    High priority for 2019-nCoV vaccine  - COVID-19,PF,PFIZER BOOSTER BIVALENT 12+Yrs    Diagnosis or treatment significantly limited by social determinants of health - Nimisha speaking     Patient Instructions   Schedule a repeat visit for OMT if your pain worsens    Your physician has referred you for Osteopathic Manual Treatment:     This is a hands on approach where the physician will use specific techniques to move muscles and joints to diagnose and treat illness or injury.  This can include stretching, gentle pressure and resistance. The treatment can be used to increase overall mobility and symmetry of the body in an effort to ease pain and promote healing.     Physician's who perform OMT at [facility]:   Dr. Brianne Timmons          Follow Up:  Return if symptoms worsen or fail to improve.    Options for treatment and follow-up care were reviewed with the patient who was engaged and actively involved in the decision making process, verbalized understanding of the options discussed, and satisfied with the final plan.    Patient was staffed with supervising physician, Dr. Geoff Wolf, who agrees with the findings and plan.     Killian Thompson DO, PGY-3  [facility]      Subjective   Htee Ren is a 43 year old year old male who presents for the following health issues  accompanied by his   No past medical history on file.  Patient Active Problem List   Diagnosis     Screening for depression     Chief Complaint   Patient presents with     Follow Up      Back pain-lower      Imm/Inj     COVID-19 VACCINE    Back pain:  Able to return back to work already  Low back pain resolved for most part  Lower R butt still hurts, hard to sit for prolonged period   Currently 4-5/10.   Taking Tylenol and Naproxen which helps  Not using the cream  Because it burned    Review of Systems:   Constitutional, HEENT, cardiovascular, pulmonary, GI, , musculoskeletal, neuro, skin, endocrine and psych systems are negative, except as otherwise noted.     Objective     /82 (BP Location: Left arm, Patient Position: Sitting, Cuff Size: Adult Large)   Pulse 70   Temp 97.9  F (36.6  C) (Oral)   Resp 14   Wt 78.2 kg (172 lb 6.4 oz)   SpO2 98%   BMI 29.14 kg/m    Body mass index is 29.14 kg/m .    Physical Exam  Cardiovascular:      Rate and Rhythm: Normal rate and regular rhythm.      Pulses: Normal pulses.      Heart sounds: Normal heart sounds. No murmur heard.  Musculoskeletal:      Comments: Tender R paraspinals  Tender R SI joint  Limited active abduction and external rotation of R leg  Limited active flexion of R leg  Full passive ROM of R leg  Negative FRANTZ, FADIR, log roll, SLR tests.         ----- Service Performed and Documented by Resident or Fellow ------

## 2023-03-20 NOTE — PROGRESS NOTES
Preceptor Attestation:    I discussed the patient with the resident and evaluated the patient in person. I have verified the content of the note, which accurately reflects my assessment of the patient and the plan of care.   Supervising Physician:  Geoff Wolf MD.

## 2023-04-20 NOTE — PROGRESS NOTES
Preceptor Attestation:    I talked to the patient on the phone and discussed the patient with the resident. I have verified the content of the note, which accurately reflects my assessment of the patient and the plan of care.   Supervising Physician:  Yunior Marmolejo MD.   Rhomboid Transposition Flap Text: The defect edges were debeveled with a #15 scalpel blade.  Given the location of the defect and the proximity to free margins a rhomboid transposition flap was deemed most appropriate.  Using a sterile surgical marker, an appropriate rhomboid flap was drawn incorporating the defect.    The area thus outlined was incised deep to adipose tissue with a #15 scalpel blade.  The skin margins were undermined to an appropriate distance in all directions utilizing iris scissors.

## 2024-01-04 ENCOUNTER — OFFICE VISIT (OUTPATIENT)
Dept: FAMILY MEDICINE | Facility: CLINIC | Age: 44
End: 2024-01-04
Payer: COMMERCIAL

## 2024-01-04 VITALS
DIASTOLIC BLOOD PRESSURE: 64 MMHG | BODY MASS INDEX: 30.05 KG/M2 | WEIGHT: 176 LBS | SYSTOLIC BLOOD PRESSURE: 104 MMHG | OXYGEN SATURATION: 98 % | HEART RATE: 82 BPM | RESPIRATION RATE: 20 BRPM | HEIGHT: 64 IN | TEMPERATURE: 97.2 F

## 2024-01-04 DIAGNOSIS — S76.011A STRAIN OF RIGHT BUTTOCK, INITIAL ENCOUNTER: Primary | ICD-10-CM

## 2024-01-04 PROCEDURE — 99213 OFFICE O/P EST LOW 20 MIN: CPT | Mod: GC

## 2024-01-04 RX ORDER — NAPROXEN 500 MG/1
500 TABLET ORAL 2 TIMES DAILY PRN
Qty: 40 TABLET | Refills: 0 | Status: SHIPPED | OUTPATIENT
Start: 2024-01-04

## 2024-01-04 RX ORDER — ACETAMINOPHEN 325 MG/1
325-650 TABLET ORAL EVERY 6 HOURS PRN
Qty: 60 TABLET | Refills: 1 | Status: SHIPPED | OUTPATIENT
Start: 2024-01-04

## 2024-01-04 NOTE — PROGRESS NOTES
Assessment & Plan     Strain of right buttock, initial encounter  History and exam most consistent with strain of right buttock muscle without inciting injury. No red flag symptoms for cauda equina including bowel or bladder dysfunction today and physical exam reassuring. Cancer or infection less likely due to lack of systemic symptoms or positive family history. Will treat conservatively with OTC analgesics and voltaren topical gel. Provided information on stretching in today's AVS.   - diclofenac (VOLTAREN) 1 % topical gel  Dispense: 150 g; Refill: 1  - naproxen (NAPROSYN) 500 MG tablet  Dispense: 40 tablet; Refill: 0  - acetaminophen (TYLENOL) 325 MG tablet  Dispense: 60 tablet; Refill: 1        Return if symptoms worsen or fail to improve.    Vesta Jaquez DO PGY2  [facility] is a 44 year old, presenting for the following health issues:  Pain (Few weeks ago pain on right side of butt radiating down to his leg. Worst at night time and when standing )        1/4/2024     9:23 AM   Additional Questions   Roomed by    Accompanied by self       HPI       Pain History:  When did you first notice your pain? 4 weeks ago, without known inciting factor or injury    Have you seen anyone else for your pain? No  Where in your body do you have pain? Back Pain  Onset/Duration: 4 weeks ago   Description:   Location of pain: right gluteus with radiation down the back of the leg to the level of the knee  Character of pain: dull ache with intermittent sharp pain   Pain radiation: radiates into the right leg  New numbness or weakness in legs, not attributed to pain: no   Intensity: Currently 6-7/10, At its worst 10/10  Progression of Symptoms: worsening  History:   Specific cause: none  History of back problems: recurrent self limited episodes of low back pain in the past  Any previous MRI or X-rays: None  Sees a specialist for back pain: No  Alleviating factors:   Improved by:  "rest    Precipitating factors:  Worsened by: Bending, Standing, and Walking  Therapies tried and outcome: none    Accompanying Signs & Symptoms:  Risk of Fracture: None  Risk of Cauda Equina: None  Risk of Infection: None  Risk of Cancer: None  Risk of Ankylosing Spondylitis: Onset at age <35, male, AND morning back stiffness: No        Objective    /64 (BP Location: Left arm, Patient Position: Sitting, Cuff Size: Adult Large)   Pulse 82   Temp 97.2  F (36.2  C) (Tympanic)   Resp 20   Ht 1.638 m (5' 4.49\")   Wt 79.8 kg (176 lb)   SpO2 98%   BMI 29.75 kg/m    Body mass index is 29.75 kg/m .  Physical Exam   GENERAL: healthy, alert and no distress  RESP: breathing comfortably on room air, no wheezing   CV: no peripheral edema and peripheral pulses strong  MS: no gross musculoskeletal defects noted, no edema  Comprehensive back pain exam:  No tenderness to palpation, Pain limits the following motions: hip flexion, Lower extremity strength functional and equal on both sides, and Lower extremity sensation normal and equal on both sides  PSYCH: mentation appears normal, affect normal/bright      ----- Service Performed and Documented by Resident or Fellow ------      "

## 2025-01-20 ENCOUNTER — OFFICE VISIT (OUTPATIENT)
Dept: FAMILY MEDICINE | Facility: CLINIC | Age: 45
End: 2025-01-20
Payer: COMMERCIAL

## 2025-01-20 VITALS
SYSTOLIC BLOOD PRESSURE: 145 MMHG | OXYGEN SATURATION: 98 % | HEART RATE: 68 BPM | BODY MASS INDEX: 29.86 KG/M2 | WEIGHT: 176.6 LBS | TEMPERATURE: 97.6 F | RESPIRATION RATE: 16 BRPM | DIASTOLIC BLOOD PRESSURE: 96 MMHG

## 2025-01-20 DIAGNOSIS — S09.11XA SPRAIN AND STRAIN OF TEMPOROMANDIBULAR JOINT: Primary | ICD-10-CM

## 2025-01-20 DIAGNOSIS — S03.40XA SPRAIN AND STRAIN OF TEMPOROMANDIBULAR JOINT: Primary | ICD-10-CM

## 2025-01-20 RX ORDER — NAPROXEN 500 MG/1
TABLET ORAL
Qty: 40 TABLET | Refills: 0 | Status: SHIPPED | OUTPATIENT
Start: 2025-01-20

## 2025-01-20 NOTE — PROGRESS NOTES
Patient Active Problem List    Diagnosis Date Noted    Screening for depression 12/15/2014     Priority: Medium     [facility] Mental Health Screener:  11/28/14: 0 (Negative)       There are no exam notes on file for this visit.  Chief Complaint   Patient presents with    Other     Ran into wall 1 week ago. Left ear pain hurts when he opens his mouth.      Blood pressure (!) 145/96, pulse 68, temperature 97.6  F (36.4  C), temperature source Oral, resp. rate 16, weight 80.1 kg (176 lb 9.6 oz), SpO2 98%.  No results found for any visits on 01/20/25.  45-year-old male here for left-sided jaw pain.  He ran into a door about a week ago and then started feeling some pain in the left side of the jaw a few days later.  There is been no bruising or swelling.  He has not tried any treatment for it.  It hurts to open his mouth.  It is better if he keeps his mouth closed.  Objective patient is alert pleasant no acute distress.  There are some mild soft tissue swelling over the lateral jaw in the preauricular space.  No bruising or ecchymosis.  TMs are clear.  He has pain with opening and closing his mouth over the left TMJ.  He has pain with side rotation of his mandible as well.  Neck otherwise supple without adenopathy.    Assessment/plan  1.  TMJ strain.  Recommend soft diet and naproxen 500 mg p.o. twice daily with food for a week then as needed.  Reassurance that this should be relatively self-limited.    2.  Elevated blood pressure.  Unique finding today.  Recommend he schedule a physical in the next few weeks to get this reassessed

## 2025-06-06 PROBLEM — I10 BENIGN ESSENTIAL HYPERTENSION: Status: ACTIVE | Noted: 2025-06-06

## 2025-06-10 ENCOUNTER — RESULTS FOLLOW-UP (OUTPATIENT)
Dept: FAMILY MEDICINE | Facility: CLINIC | Age: 45
End: 2025-06-10